# Patient Record
Sex: MALE | Race: WHITE | NOT HISPANIC OR LATINO | Employment: OTHER | URBAN - METROPOLITAN AREA
[De-identification: names, ages, dates, MRNs, and addresses within clinical notes are randomized per-mention and may not be internally consistent; named-entity substitution may affect disease eponyms.]

---

## 2024-09-29 PROBLEM — M19.90 OSTEOARTHRITIS: Status: ACTIVE | Noted: 2024-09-29

## 2024-10-03 PROBLEM — M54.50 CHRONIC MIDLINE LOW BACK PAIN WITHOUT SCIATICA: Status: ACTIVE | Noted: 2024-10-03

## 2024-10-03 PROBLEM — Z96.641 HISTORY OF RIGHT HIP REPLACEMENT: Status: ACTIVE | Noted: 2024-10-03

## 2024-10-03 PROBLEM — G89.29 CHRONIC MIDLINE LOW BACK PAIN WITHOUT SCIATICA: Status: ACTIVE | Noted: 2024-10-03

## 2024-10-03 PROBLEM — N28.9 RENAL INSUFFICIENCY: Status: ACTIVE | Noted: 2024-10-03

## 2024-10-03 PROBLEM — M13.10 MONOARTHRITIS: Status: ACTIVE | Noted: 2024-10-03

## 2024-10-03 PROBLEM — M15.9 GENERALIZED OSTEOARTHRITIS: Status: ACTIVE | Noted: 2024-10-03

## 2024-10-03 NOTE — ASSESSMENT & PLAN NOTE
We discussed options including splinting, injections and surgical intervention.    He will would like to try injections.

## 2024-10-03 NOTE — PROGRESS NOTES
Office Follow Up      Date: 10/14/2024   Patient Name: Herson Griggs  MRN: [id number]  YOB: 1931    Referring Physician: No ref. provider found     Chief Complaint: Osteoarthritis      History of Present Illness: Herson Griggs is a 89 y.o. male who is here today for follow up on osteoarthritis.  The patient has osteoarthritis and had been having recurrent flares of inflammatory arthritis.  X-ray showed chondrocalcinosis and uric acid was low.  This is presumed pseudogout.  No flares in the interim. Has not needed colchicine.   He has been doing well. Has been kayaking.   OA of CMC joints are still painful. He would like injections.   Had nerve blocks 1 year ago that helped knee pain. Back is painful but injections help. CMC joints are painful, R>L.   Had lumbar surgery with Dr Mancini. Did well.  Pain level is 2/10. EMS is 0.  Severity level is severe.  Location of the pain is lower back, upper back, bilateral hand  and bilateral knee. The patient describes it as stiff, crunching  and achy.  It occurs persistently.  The problem is fluctuating.  Symptom is aggravated by bending, walking upstairs, walking downstairs, standing, using hands, grasping objects  and weather.  Relieving factors include injections.  Associated symptoms include gelling phenomenon  and nocturnal pain.  Pertinent negatives include claudication, diarrhea, dyspnea, fatigue, fever, incontinence (urinary), rash and weight loss.     Subjective   Review of Systems: Review of Systems     Medications:   Current Outpatient Medications:     Acetaminophen (TYLENOL PO), Take 325 mg by mouth Every 6 (Six) Hours As Needed., Disp: , Rfl:     amLODIPine (NORVASC) 2.5 MG tablet, Take 1 tablet by mouth Daily., Disp: , Rfl:     amLODIPine (NORVASC) 5 MG tablet, Take 1 tablet by mouth Daily., Disp: , Rfl:     Cholecalciferol (VITAMIN D-3 PO), Take 125 mg by mouth Daily., Disp: , Rfl:     DULoxetine (CYMBALTA) 20 MG capsule, Take 1  capsule by mouth Daily., Disp: , Rfl:     ferrous sulfate 325 (65 FE) MG tablet, Take 1 tablet by mouth Daily., Disp: , Rfl:     finasteride (PROSCAR) 5 MG tablet, Take 1 tablet by mouth Daily., Disp: , Rfl:     hydroCHLOROthiazide 12.5 MG tablet, Take 1 tablet by mouth Daily., Disp: , Rfl:     hydrOXYzine (ATARAX) 25 MG tablet, Take 1 tablet by mouth 3 (Three) Times a Day., Disp: , Rfl:     latanoprost (XALATAN) 0.005 % ophthalmic solution, 1 drop Every Night., Disp: , Rfl:     levothyroxine (SYNTHROID, LEVOTHROID) 125 MCG tablet, Take 1 tablet by mouth Daily., Disp: , Rfl:     linaCLOtide (LINZESS PO), , Disp: , Rfl:     Macrobid 100 MG capsule, Take 1 capsule every day by oral route at bedtime., Disp: , Rfl:     multivitamin tablet tablet, Take 1 tablet by mouth Daily., Disp: , Rfl:     timolol (Betimol) 0.25 % ophthalmic solution, Daily., Disp: , Rfl:     timolol (TIMOPTIC) 0.5 % ophthalmic solution, 1 drop Daily., Disp: , Rfl:     albuterol sulfate  (90 Base) MCG/ACT inhaler, Take 2 puffs by mouth Every 4 (Four) Hours As Needed. (Patient not taking: Reported on 10/14/2024), Disp: , Rfl:     amoxicillin (AMOXIL) 500 MG capsule, take 1 capsule by mouth every 8 hours until gone (Patient not taking: Reported on 10/14/2024), Disp: , Rfl:     cyclobenzaprine (FLEXERIL) 5 MG tablet, , Disp: , Rfl:     Ferrous Sulfate (IRON PO), Take  by mouth. AS DIRECTED (Patient not taking: Reported on 10/14/2024), Disp: , Rfl:     Fluticasone Furoate-Vilanterol (BREO ELLIPTA IN), , Disp: , Rfl:     HYDROcodone-acetaminophen (NORCO) 5-325 MG per tablet, Take 1 tablet by mouth Every 6 (Six) Hours As Needed. (Patient not taking: Reported on 10/14/2024), Disp: , Rfl:     HYDROcodone-acetaminophen (NORCO) 7.5-325 MG per tablet, Take 1 tablet by mouth 2 (Two) Times a Day. (Patient not taking: Reported on 10/14/2024), Disp: , Rfl:     HYDROcodone-Acetaminophen (XODOL) 7.5-300 MG per tablet, Take 1 tablet by mouth 2 (Two) Times a  "Day. (Patient not taking: Reported on 10/14/2024), Disp: , Rfl:     hydrOXYzine (ATARAX) 25 MG tablet, Take 1 tablet by mouth Daily. (Patient not taking: Reported on 10/14/2024), Disp: , Rfl:     montelukast (SINGULAIR) 10 MG tablet, Take 1 tablet by mouth Every Evening. (Patient not taking: Reported on 10/14/2024), Disp: , Rfl:     Montelukast Sodium (SINGULAIR PO), , Disp: , Rfl:     multivitamin with minerals (PRESERVISION AREDS PO), Take 1 tablet by mouth Daily. (Patient not taking: Reported on 10/14/2024), Disp: , Rfl:     Tamsulosin HCl (FLOMAX PO), , Disp: , Rfl:     terazosin (HYTRIN) 5 MG capsule, Take 1 capsule twice a day by oral route. (Patient not taking: Reported on 10/14/2024), Disp: , Rfl:     Timolol Hemihydrate (BETIMOL OP), , Disp: , Rfl:     Allergies: No Known Allergies    I have reviewed and updated the patient's chief complaint, history of present illness, review of systems, past medical history, surgical history, family history, social history, medications and allergy list as appropriate.     Objective    Vital Signs:   Vitals:    10/14/24 1610   BP: 142/90   BP Location: Left arm   Patient Position: Sitting   Cuff Size: Adult   Pulse: 76   Temp: 97 °F (36.1 °C)   Weight: 88 kg (194 lb)   Height: 172.7 cm (68\")   PainSc:   2   PainLoc: Finger     Body mass index is 29.5 kg/m².    Physical Exam:  GENERAL:  The patient is well-developed and well nourished.  Cooperative and oriented x3.  Affect is normal.  Hydration appears normal.  HEENT:  Normocephalic and atraumatic.  There is male pattern hair loss.  No facial rash.  Lids and conjunctiva are normal.  Pupils are equal and sclerae are clear.  I see no oral or nasal ulcers.  Lips, teeth, and gums are within normal limits.  Oropharynx is clear.  NECK:  Neck is supple without adenopathy, masses, or thyromegaly.  CARDIOVASCULAR:  Normal S1, S2.  No murmurs are heard.  LUNGS:  Clear to auscultation and percussion.  ABDOMEN:  Soft and nontender " without masses or  hepatosplenomegaly.  EXTREMITIES:  2+ edema on the right and 1+ edema on the left.  No cyanosis or clubbing.  SKIN:  Actinic changes are present.  I see no psoriatic lesions or nodules.  I see no tophi.  NEUROLOGIC:  Gait is normal.   MUSCULOSKELETAL:  Complete joint exam was performed.  There was full range of motion of the shoulders, elbows, wrists, and hands without notable deformities, soft tissue swelling, synovitis, or atrophy except as noted.  Shoulders have good range of motion but marked crepitus with mild pain on the left.  There is severe squaring of the 1st CMC joint bilaterally.  MCPs are unremarkable.  Heberden's and Sanna's nodes are present.  Hips have good flexion and internal and external rotation.  Knees have small, cool palpable effusions.  There is slightly limited flexion and full extension of the knees with crepitus and mild pain.  Ankles have no soft tissue swelling or synovitis or major deformities.  BACK:  Straight without scoliosis.    Assessment / Plan      Assessment & Plan  Monoarthritis  Recurrent monoarthritis beginning approximately 2 years ago.  Sudden onset lasting 8-12 days.  Has involved in ankle, wrist, and left elbow.  Uric acid 5.3.  X-ray shows chondrocalcinosis..    Presumptive diagnosis is pseudogout.  No flares in thew interim.   I will try low-dose colchicine p.r.n. flare..  Low-dose should be safe with mild renal insufficiency.  I will get recent labs from PCP to assure that colchicine is still appropriate.  He was instructed to call if he flares..  Risk of colchicine was discussed at length including GI side effects such as diarrhea, drops in blood counts, and neurologic and musculoskeletal side effects.  Recheck in 6 months.   Generalized osteoarthritis  Pain is degenerative in nature with the worst areas being the low back, both knees, both 1st CMC joints, and both shoulders.  Status post 2 rotator cuff repairs in each shoulder.  Status post  right total hip replacement with good results.  He has had some response in the past to injections of both steroids and viscosupplementation in the knees.  NSAIDs stopped due to renal insufficiency.  Failed duloxetine.    He has severe degenerative arthritis in multiple joints.  He is not a candidate for nonsteroidal anti-inflammatory drugs.  Narcotic drugs would be high risk for falling.  Duloxetine was ineffective.    I told him we could inject the shoulders, CMC joints, or knees.   He is already followed by Orthopedics and a pain center.  Chronic midline low back pain without sciatica  Status post lumbar laminectomy L4-5 1980.   He had multilevel laminectomy 2022 with Dr. Mancini.    He has chronic back pain without current radicular symptoms.  He is followed by a pain center.  Primary osteoarthritis of both shoulders  He has had bilateral rotator cuff repairs on 2 occasions each.  Primary osteoarthritis of both first carpometacarpal joints  We discussed options including splinting, injections and surgical intervention.    He will would like to try injections.   Renal insufficiency  No NSAIDs  Primary osteoarthritis of both knees  Clinically this seems to be very severe.  He had a nerve ablation that helps somewhat.  History of right hip replacement  He has done well with this.             Follow Up:   Return in about 6 months (around 4/14/2025).        Aristeo Edwards MD  [facility]

## 2024-10-03 NOTE — ASSESSMENT & PLAN NOTE
Status post lumbar laminectomy L4-5 1980.   He had multilevel laminectomy 2022 with Dr. Mancini.    He has chronic back pain without current radicular symptoms.  He is followed by a pain center.

## 2024-10-03 NOTE — ASSESSMENT & PLAN NOTE
Pain is degenerative in nature with the worst areas being the low back, both knees, both 1st CMC joints, and both shoulders.  Status post 2 rotator cuff repairs in each shoulder.  Status post right total hip replacement with good results.  He has had some response in the past to injections of both steroids and viscosupplementation in the knees.  NSAIDs stopped due to renal insufficiency.  Failed duloxetine.    He has severe degenerative arthritis in multiple joints.  He is not a candidate for nonsteroidal anti-inflammatory drugs.  Narcotic drugs would be high risk for falling.  Duloxetine was ineffective.    I told him we could inject the shoulders, CMC joints, or knees.   He is already followed by Orthopedics and a pain center.

## 2024-10-03 NOTE — ASSESSMENT & PLAN NOTE
Recurrent monoarthritis beginning approximately 2 years ago.  Sudden onset lasting 8-12 days.  Has involved in ankle, wrist, and left elbow.  Uric acid 5.3.  X-ray shows chondrocalcinosis..    Presumptive diagnosis is pseudogout.  No flares in thew interim.   I will try low-dose colchicine p.r.n. flare..  Low-dose should be safe with mild renal insufficiency.  I will get recent labs from PCP to assure that colchicine is still appropriate.  He was instructed to call if he flares..  Risk of colchicine was discussed at length including GI side effects such as diarrhea, drops in blood counts, and neurologic and musculoskeletal side effects.  Recheck in 6 months.

## 2024-10-14 ENCOUNTER — TELEPHONE (OUTPATIENT)
Age: 89
End: 2024-10-14

## 2024-10-14 ENCOUNTER — OFFICE VISIT (OUTPATIENT)
Age: 89
End: 2024-10-14
Payer: MEDICARE

## 2024-10-14 VITALS
SYSTOLIC BLOOD PRESSURE: 142 MMHG | HEIGHT: 68 IN | DIASTOLIC BLOOD PRESSURE: 90 MMHG | WEIGHT: 194 LBS | BODY MASS INDEX: 29.4 KG/M2 | HEART RATE: 76 BPM | TEMPERATURE: 97 F

## 2024-10-14 DIAGNOSIS — M15.9 GENERALIZED OSTEOARTHRITIS: ICD-10-CM

## 2024-10-14 DIAGNOSIS — M18.0 PRIMARY OSTEOARTHRITIS OF BOTH FIRST CARPOMETACARPAL JOINTS: ICD-10-CM

## 2024-10-14 DIAGNOSIS — N28.9 RENAL INSUFFICIENCY: ICD-10-CM

## 2024-10-14 DIAGNOSIS — M17.0 PRIMARY OSTEOARTHRITIS OF BOTH KNEES: ICD-10-CM

## 2024-10-14 DIAGNOSIS — M19.012 PRIMARY OSTEOARTHRITIS OF BOTH SHOULDERS: ICD-10-CM

## 2024-10-14 DIAGNOSIS — M19.011 PRIMARY OSTEOARTHRITIS OF BOTH SHOULDERS: ICD-10-CM

## 2024-10-14 DIAGNOSIS — M13.10 MONOARTHRITIS: Primary | ICD-10-CM

## 2024-10-14 DIAGNOSIS — G89.29 CHRONIC MIDLINE LOW BACK PAIN WITHOUT SCIATICA: ICD-10-CM

## 2024-10-14 DIAGNOSIS — Z96.641 HISTORY OF RIGHT HIP REPLACEMENT: ICD-10-CM

## 2024-10-14 DIAGNOSIS — M54.50 CHRONIC MIDLINE LOW BACK PAIN WITHOUT SCIATICA: ICD-10-CM

## 2024-10-14 PROCEDURE — 1159F MED LIST DOCD IN RCRD: CPT | Performed by: INTERNAL MEDICINE

## 2024-10-14 PROCEDURE — 1160F RVW MEDS BY RX/DR IN RCRD: CPT | Performed by: INTERNAL MEDICINE

## 2024-10-14 PROCEDURE — 99214 OFFICE O/P EST MOD 30 MIN: CPT | Performed by: INTERNAL MEDICINE

## 2024-10-14 RX ORDER — HYDROCODONE BITARTRATE AND ACETAMINOPHEN 7.5; 3 MG/1; MG/1
1 TABLET ORAL 2 TIMES DAILY
COMMUNITY

## 2024-10-14 RX ORDER — MONTELUKAST SODIUM 10 MG/1
1 TABLET ORAL EVERY EVENING
COMMUNITY

## 2024-10-14 RX ORDER — TIMOLOL 2.56 MG/ML
SOLUTION/ DROPS OPHTHALMIC DAILY
COMMUNITY

## 2024-10-14 RX ORDER — DIPHENOXYLATE HYDROCHLORIDE AND ATROPINE SULFATE 2.5; .025 MG/1; MG/1
1 TABLET ORAL DAILY
COMMUNITY

## 2024-10-14 RX ORDER — NITROFURANTOIN MONOHYDRATE/MACROCRYSTALLINE 25; 75 MG/1; MG/1
CAPSULE ORAL
COMMUNITY
Start: 2024-07-01 | End: 2024-10-16 | Stop reason: SDUPTHER

## 2024-10-14 RX ORDER — HYDROXYZINE HYDROCHLORIDE 25 MG/1
25 TABLET, FILM COATED ORAL DAILY
COMMUNITY
Start: 2024-09-03

## 2024-10-14 RX ORDER — ALBUTEROL SULFATE 90 UG/1
2 INHALANT RESPIRATORY (INHALATION) EVERY 4 HOURS PRN
COMMUNITY

## 2024-10-14 RX ORDER — HYDROCHLOROTHIAZIDE 12.5 MG/1
1 TABLET ORAL DAILY
COMMUNITY

## 2024-10-14 RX ORDER — FERROUS SULFATE 325(65) MG
325 TABLET ORAL DAILY
COMMUNITY

## 2024-10-14 RX ORDER — AMOXICILLIN 500 MG/1
CAPSULE ORAL
COMMUNITY
Start: 2024-07-24

## 2024-10-14 RX ORDER — CYCLOBENZAPRINE HCL 5 MG
TABLET ORAL
COMMUNITY

## 2024-10-14 RX ORDER — HYDROCODONE BITARTRATE AND ACETAMINOPHEN 7.5; 325 MG/1; MG/1
1 TABLET ORAL 2 TIMES DAILY
COMMUNITY

## 2024-10-14 RX ORDER — TERAZOSIN 5 MG/1
CAPSULE ORAL
COMMUNITY

## 2024-10-14 RX ORDER — HYDROXYZINE HYDROCHLORIDE 25 MG/1
1 TABLET, FILM COATED ORAL 3 TIMES DAILY
COMMUNITY

## 2024-10-14 RX ORDER — HYDROCODONE BITARTRATE AND ACETAMINOPHEN 5; 325 MG/1; MG/1
1 TABLET ORAL EVERY 6 HOURS PRN
COMMUNITY

## 2024-10-16 ENCOUNTER — CLINICAL SUPPORT (OUTPATIENT)
Age: 89
End: 2024-10-16
Payer: MEDICARE

## 2024-10-16 VITALS
SYSTOLIC BLOOD PRESSURE: 138 MMHG | TEMPERATURE: 97.7 F | HEIGHT: 68 IN | WEIGHT: 195 LBS | HEART RATE: 67 BPM | BODY MASS INDEX: 29.55 KG/M2 | DIASTOLIC BLOOD PRESSURE: 70 MMHG

## 2024-10-16 DIAGNOSIS — M18.0 ARTHRITIS OF CARPOMETACARPAL (CMC) JOINT OF BOTH THUMBS: Primary | Chronic | ICD-10-CM

## 2024-10-16 RX ORDER — METHYLPREDNISOLONE ACETATE 40 MG/ML
20 INJECTION, SUSPENSION INTRA-ARTICULAR; INTRALESIONAL; INTRAMUSCULAR; SOFT TISSUE
Status: COMPLETED | OUTPATIENT
Start: 2024-10-16 | End: 2024-10-16

## 2024-10-16 RX ORDER — METHYLPREDNISOLONE ACETATE 80 MG/ML
20 INJECTION, SUSPENSION INTRA-ARTICULAR; INTRALESIONAL; INTRAMUSCULAR; SOFT TISSUE ONCE
Status: COMPLETED | OUTPATIENT
Start: 2024-10-16 | End: 2024-10-16

## 2024-10-16 RX ORDER — NITROFURANTOIN MACROCRYSTALS 50 MG/1
50 CAPSULE ORAL DAILY
COMMUNITY

## 2024-10-16 RX ADMIN — METHYLPREDNISOLONE ACETATE 20 MG: 80 INJECTION, SUSPENSION INTRA-ARTICULAR; INTRALESIONAL; INTRAMUSCULAR; SOFT TISSUE at 11:15

## 2024-10-16 RX ADMIN — METHYLPREDNISOLONE ACETATE 20 MG: 80 INJECTION, SUSPENSION INTRA-ARTICULAR; INTRALESIONAL; INTRAMUSCULAR; SOFT TISSUE at 11:16

## 2024-10-16 RX ADMIN — METHYLPREDNISOLONE ACETATE 20 MG: 40 INJECTION, SUSPENSION INTRA-ARTICULAR; INTRALESIONAL; INTRAMUSCULAR; SOFT TISSUE at 12:31

## 2024-10-16 RX ADMIN — METHYLPREDNISOLONE ACETATE 20 MG: 40 INJECTION, SUSPENSION INTRA-ARTICULAR; INTRALESIONAL; INTRAMUSCULAR; SOFT TISSUE at 12:30

## 2024-10-16 NOTE — PROGRESS NOTES
Office Visit       Date: 10/16/2024   Patient Name: Herson Griggs  MRN: [id number]  YOB: 1931    Referring Physician: Lilly Orantes APRN     Chief Complaint:   Chief Complaint   Patient presents with    Injections    Osteoarthritis       History of Present Illness: Herson Griggs is a 89 y.o. male who is here today for bilateral CMC steroid injections.  He has never had this done before.  He has failed conservative measures.      Subjective   Review of Systems:  Review of Systems   Musculoskeletal:  Positive for arthralgias.   All other systems reviewed and are negative.       Past Medical History:   Past Medical History:   Diagnosis Date    Anemia     Arthritis     Gout     Hypertension     Prostate disease        Past Surgical History:   Past Surgical History:   Procedure Laterality Date    CARPAL TUNNEL RELEASE      GLAUCOMA SURGERY      HIP SURGERY      INGUINAL HERNIA REPAIR      ROTATOR CUFF REPAIR Bilateral     TONSILLECTOMY         Family History:   Family History   Problem Relation Age of Onset    Multiple sclerosis Father        Social History:   Social History     Socioeconomic History    Marital status: Unknown   Tobacco Use    Smoking status: Never    Smokeless tobacco: Never   Vaping Use    Vaping status: Never Used   Substance and Sexual Activity    Alcohol use: Not Currently    Drug use: Defer    Sexual activity: Defer       Medications:   Current Outpatient Medications:     Acetaminophen (TYLENOL PO), Take 325 mg by mouth Every 6 (Six) Hours As Needed., Disp: , Rfl:     albuterol sulfate  (90 Base) MCG/ACT inhaler, Take 2 puffs by mouth Every 4 (Four) Hours As Needed., Disp: , Rfl:     amLODIPine (NORVASC) 2.5 MG tablet, Take 1 tablet by mouth Daily., Disp: , Rfl:     amLODIPine (NORVASC) 5 MG tablet, Take 1 tablet by mouth Daily., Disp: , Rfl:     amoxicillin (AMOXIL) 500 MG capsule, , Disp: , Rfl:     Cholecalciferol (VITAMIN D-3 PO), Take 125 mg by mouth  Daily., Disp: , Rfl:     cyclobenzaprine (FLEXERIL) 5 MG tablet, , Disp: , Rfl:     DULoxetine (CYMBALTA) 20 MG capsule, Take 1 capsule by mouth Daily., Disp: , Rfl:     Ferrous Sulfate (IRON PO), Take  by mouth. AS DIRECTED, Disp: , Rfl:     ferrous sulfate 325 (65 FE) MG tablet, Take 1 tablet by mouth Daily., Disp: , Rfl:     finasteride (PROSCAR) 5 MG tablet, Take 1 tablet by mouth Daily., Disp: , Rfl:     Fluticasone Furoate-Vilanterol (BREO ELLIPTA IN), , Disp: , Rfl:     hydroCHLOROthiazide 12.5 MG tablet, Take 1 tablet by mouth Daily., Disp: , Rfl:     HYDROcodone-acetaminophen (NORCO) 5-325 MG per tablet, Take 1 tablet by mouth Every 6 (Six) Hours As Needed., Disp: , Rfl:     HYDROcodone-acetaminophen (NORCO) 7.5-325 MG per tablet, Take 1 tablet by mouth 2 (Two) Times a Day., Disp: , Rfl:     HYDROcodone-Acetaminophen (XODOL) 7.5-300 MG per tablet, Take 1 tablet by mouth 2 (Two) Times a Day., Disp: , Rfl:     hydrOXYzine (ATARAX) 25 MG tablet, Take 1 tablet by mouth 3 (Three) Times a Day., Disp: , Rfl:     hydrOXYzine (ATARAX) 25 MG tablet, Take 1 tablet by mouth Daily., Disp: , Rfl:     latanoprost (XALATAN) 0.005 % ophthalmic solution, 1 drop Every Night., Disp: , Rfl:     levothyroxine (SYNTHROID, LEVOTHROID) 125 MCG tablet, Take 1 tablet by mouth Daily., Disp: , Rfl:     linaCLOtide (LINZESS PO), , Disp: , Rfl:     montelukast (SINGULAIR) 10 MG tablet, Take 1 tablet by mouth Every Evening., Disp: , Rfl:     Montelukast Sodium (SINGULAIR PO), , Disp: , Rfl:     multivitamin tablet tablet, Take 1 tablet by mouth Daily., Disp: , Rfl:     multivitamin with minerals (PRESERVISION AREDS PO), Take 1 tablet by mouth Daily., Disp: , Rfl:     nitrofurantoin (MACRODANTIN) 50 MG capsule, Take 1 capsule by mouth Daily., Disp: , Rfl:     Tamsulosin HCl (FLOMAX PO), , Disp: , Rfl:     terazosin (HYTRIN) 5 MG capsule, , Disp: , Rfl:     timolol (Betimol) 0.25 % ophthalmic solution, Daily., Disp: , Rfl:     timolol  "(TIMOPTIC) 0.5 % ophthalmic solution, 1 drop Daily., Disp: , Rfl:     Timolol Hemihydrate (BETIMOL OP), , Disp: , Rfl:   No current facility-administered medications for this visit.    Allergies: No Known Allergies    I have reviewed and updated the patient's chief complaint, history of present illness, review of systems, past medical history, surgical history, family history, social history, medications and allergy list as appropriate.     Objective    Vital Signs:   Vitals:    10/16/24 1054   BP: 138/70   BP Location: Left arm   Pulse: 67   Temp: 97.7 °F (36.5 °C)   Weight: 88.5 kg (195 lb)   Height: 172.7 cm (68\")   PainSc:   3     Body mass index is 29.65 kg/m².   Defer to pcp       Physical Exam:  Physical Exam  Constitutional:       Appearance: Normal appearance.   Cardiovascular:      Rate and Rhythm: Normal rate.   Pulmonary:      Effort: Pulmonary effort is normal.   Musculoskeletal:      Comments: Bilateral CMC tenderness   Skin:     General: Skin is warm and dry.   Neurological:      General: No focal deficit present.      Mental Status: He is alert and oriented to person, place, and time.   Psychiatric:         Mood and Affect: Mood normal.         Behavior: Behavior normal.         Thought Content: Thought content normal.          Results Review:   Imaging Results (Last 24 Hours)       ** No results found for the last 24 hours. **            Arthrocentesis    Date/Time: 10/16/2024 12:30 PM    Performed by: Omari Jo APRN  Authorized by: Omari Jo APRN  Indications: pain   Body area: finger  Location details: right thumb  Local anesthesia used: yes    Anesthesia:  Local anesthesia used: yes  Local Anesthetic: lidocaine spray    Sedation:  Patient sedated: no    Needle gauge: 25.  Ultrasound guidance: no  Meds administered: 20 mg methylPREDNISolone acetate 40 MG/ML  Patient tolerance: patient tolerated the procedure well with no immediate " complications      Arthrocentesis    Date/Time: 10/16/2024 12:31 PM    Performed by: Omari Jo APRN  Authorized by: Omari Jo APRN  Indications: pain   Body area: finger  Location details: left thumb  Local anesthesia used: yes    Anesthesia:  Local anesthesia used: yes  Local Anesthetic: lidocaine spray    Sedation:  Patient sedated: no    Needle gauge: 25.  Ultrasound guidance: no  Meds administered: 20 mg methylPREDNISolone acetate 40 MG/ML  Patient tolerance: patient tolerated the procedure well with no immediate complications          Assessment / Plan    Assessment/Plan:   Diagnoses and all orders for this visit:    1. Arthritis of carpometacarpal (CMC) joint of both thumbs (Primary)  Assessment & Plan:  Bilateral steroid CMC injections 10/16/2024    Consent form reviewed and signed  See procedure documentation and MAR  Patient can have this done every 3 months as needed    Orders:  -     methylPREDNISolone acetate (DEPO-medrol) injection 20 mg  -     methylPREDNISolone acetate (DEPO-medrol) injection 20 mg    Other orders  -     Arthrocentesis  -     Arthrocentesis        Follow Up:   Return for Next scheduled follow up.        FILOMENA Rodriguez  [facility]

## 2024-10-16 NOTE — ASSESSMENT & PLAN NOTE
Bilateral steroid CMC injections 10/16/2024    Consent form reviewed and signed  See procedure documentation and MAR  Patient can have this done every 3 months as needed